# Patient Record
Sex: FEMALE | Race: WHITE | NOT HISPANIC OR LATINO | Employment: UNEMPLOYED | URBAN - METROPOLITAN AREA
[De-identification: names, ages, dates, MRNs, and addresses within clinical notes are randomized per-mention and may not be internally consistent; named-entity substitution may affect disease eponyms.]

---

## 2024-01-16 ENCOUNTER — HOSPITAL ENCOUNTER (EMERGENCY)
Facility: HOSPITAL | Age: 24
Discharge: HOME | End: 2024-01-16
Attending: EMERGENCY MEDICINE | Admitting: EMERGENCY MEDICINE
Payer: COMMERCIAL

## 2024-01-16 VITALS
DIASTOLIC BLOOD PRESSURE: 86 MMHG | OXYGEN SATURATION: 100 % | SYSTOLIC BLOOD PRESSURE: 130 MMHG | HEART RATE: 98 BPM | RESPIRATION RATE: 18 BRPM | TEMPERATURE: 98.7 F

## 2024-01-16 DIAGNOSIS — J02.9 PHARYNGITIS, UNSPECIFIED ETIOLOGY: ICD-10-CM

## 2024-01-16 LAB — GROUP A STREP BY PCR: DETECTED

## 2024-01-16 PROCEDURE — 99283 EMERGENCY DEPT VISIT LOW MDM: CPT

## 2024-01-16 PROCEDURE — 87651 STREP A DNA AMP PROBE: CPT | Performed by: EMERGENCY MEDICINE

## 2024-01-16 RX ORDER — AMOXICILLIN 500 MG/1
500 CAPSULE ORAL 3 TIMES DAILY
Qty: 21 CAPSULE | Refills: 0 | Status: SHIPPED | OUTPATIENT
Start: 2024-01-16 | End: 2024-01-23

## 2024-01-16 NOTE — ED PROVIDER NOTES
EMERGENCY DEPARTMENT ENCOUNTER      NAME: Antonia Jernigan  AGE: 23 year old female  YOB: 2000  MRN: [id number]  EVALUATION DATE & TIME: No admission date for patient encounter.    PCP: No Ref-Primary, Physician    ED PROVIDER: Abeba Kennedy MD    Chief Complaint   Patient presents with    Pharyngitis         FINAL IMPRESSION:  1. Pharyngitis, unspecified etiology          ED COURSE & MEDICAL DECISION MAKING:    Pertinent Labs & Imaging studies reviewed. (See chart for details)  23 year old female with history of otherwise healthy who presents to the Emergency Department for evaluation of sore throat.  Mildly tachycardic on arrival with mild posterior pharyngeal erythema and associated lymphadenopathy highly suspicious for bacterial pharyngitis.  Rapid strepPositive.  Treated with amoxicillin.  No signs of peritonsillar, retropharyngeal abscess on examination.  Safe for discharge to home.      ED Course as of 01/16/24 1600   Tue Jan 16, 2024   1441 Strep Group A PCR(!): Detected       Medical Decision Making    History:  Supplemental history from: Documented in chart  External Record(s) reviewed: Documented in chart    Work Up:  Chart documentation includes differential considered and any EKGs or imaging independently interpreted by provider, see MDM  In additional to work up documented, I considered the following work up: see MDM    External consultation:  Discussion of management with another provider: N/A    Complicating factors:  Care impacted by chronic illness: N/A  Care affected by social determinants of health: Access to Medical Care and Access to Affordable Health Care    Disposition considerations: Discharge. I prescribed additional prescription strength medication(s) as charted. See documentation for any additional details.        At the conclusion of the encounter I discussed the results of all of the tests and the disposition. The questions were answered. The patient or family  acknowledged understanding and was agreeable with the care plan.        MEDICATIONS GIVEN IN THE EMERGENCY:  Medications - No data to display    NEW PRESCRIPTIONS STARTED AT TODAY'S ER VISIT  Discharge Medication List as of 1/16/2024  2:45 PM        START taking these medications    Details   amoxicillin (AMOXIL) 500 MG capsule Take 1 capsule (500 mg) by mouth 3 times daily for 7 days, Disp-21 capsule, R-0, Local Print                =================================================================    HPI    Patient information was obtained from: Patient    Use of Intrepreter: N/A      Antonia Jernigan is a 23 year old female with pertinent medical history of otherwise healthy who presents 4 days of sore throat.  Notes 4 days of sore throat with some associated generalized muscle aches and fatigue.  Daughter was ill last week with URI but had more cough and chest congestion.  Patient does not have any cough or chest congestion.  Notes that the throat has been more swollen and sore today limiting swallowing prompting ED visit.  No known strep exposure.    PAST MEDICAL HISTORY:  No past medical history on file.    PAST SURGICAL HISTORY:  No past surgical history on file.    CURRENT MEDICATIONS:    None       ALLERGIES:  No Known Allergies    FAMILY HISTORY:  No family history on file.    SOCIAL HISTORY:        VITALS:  Patient Vitals for the past 24 hrs:   BP Temp Pulse Resp SpO2   01/16/24 1445 130/86 -- 98 18 100 %   01/16/24 1315 (!) 141/92 98.7  F (37.1  C) 114 20 99 %       PHYSICAL EXAM    General Appearance: Well-appearing, well-nourished, no acute distress  ENT:  Lips, mucosa, and tongue normal; membranes are moist without pallor.  Mild posterior pharyngeal erythema with 1-2+ tonsils.  No uvular deviation  Neck:  Supple, anterior cervical lymphadenopathy bilaterally  Cardio: Tachycardic initially normalized on recheck  Pulm:  No respiratory distress, clear to auscultation bilaterally  Extremities: Normal  gait  Skin:  Skin warm, dry, no rashes  Neuro:  Alert and oriented ×3     RADIOLOGY/LABS:  Reviewed all pertinent imaging. Please see official radiology report. All pertinent labs reviewed and interpreted.    Results for orders placed or performed during the hospital encounter of 01/16/24   Group A Streptococcus PCR Throat Swab    Specimen: Throat; Swab   Result Value Ref Range    Group A strep by PCR Detected (A) Not Detected       Abeba Kennedy MD  Emergency Medicine  [facility] EMERGENCY DEPARTMENT  [employer] [facility] [id number] [phone]  Dept: [phone]     Abeba Kennedy MD  01/16/24 4717

## 2024-01-16 NOTE — ED TRIAGE NOTES
Sore throat since Saturday and unable to eat due to pain. Redness and swelling noted. Lymph node swelling to right posterior neck. Denies fevers however having chills and sweating.

## 2024-01-16 NOTE — ED TRIAGE NOTES
Redness and some swelling noted to back of throat.      Triage Assessment (Adult)       Row Name 01/16/24 1718          Triage Assessment    Airway WDL WDL        Respiratory WDL    Respiratory WDL WDL        Skin Circulation/Temperature WDL    Skin Circulation/Temperature WDL WDL        Cardiac WDL    Cardiac WDL WDL        Peripheral/Neurovascular WDL    Peripheral Neurovascular WDL WDL        Cognitive/Neuro/Behavioral WDL    Cognitive/Neuro/Behavioral WDL WDL